# Patient Record
Sex: MALE | Race: OTHER | HISPANIC OR LATINO | URBAN - METROPOLITAN AREA
[De-identification: names, ages, dates, MRNs, and addresses within clinical notes are randomized per-mention and may not be internally consistent; named-entity substitution may affect disease eponyms.]

---

## 2023-03-20 ENCOUNTER — HOSPITAL ENCOUNTER (EMERGENCY)
Facility: HOSPITAL | Age: 38
Discharge: HOME | End: 2023-03-20
Attending: EMERGENCY MEDICINE

## 2023-03-20 VITALS
SYSTOLIC BLOOD PRESSURE: 118 MMHG | TEMPERATURE: 98.3 F | HEART RATE: 67 BPM | DIASTOLIC BLOOD PRESSURE: 72 MMHG | RESPIRATION RATE: 16 BRPM | OXYGEN SATURATION: 99 %

## 2023-03-20 DIAGNOSIS — S01.01XA LACERATION OF SCALP, INITIAL ENCOUNTER: Primary | ICD-10-CM

## 2023-03-20 DIAGNOSIS — S09.90XA CLOSED HEAD INJURY, INITIAL ENCOUNTER: ICD-10-CM

## 2023-03-20 RX ORDER — IBUPROFEN 600 MG/1
600 TABLET ORAL ONCE
Status: COMPLETED | OUTPATIENT
Start: 2023-03-20 | End: 2023-03-20

## 2023-03-20 RX ORDER — ACETAMINOPHEN 325 MG/1
975 TABLET ORAL ONCE
Status: COMPLETED | OUTPATIENT
Start: 2023-03-20 | End: 2023-03-20

## 2023-03-20 RX ORDER — LIDOCAINE 40 MG/G
CREAM TOPICAL ONCE
Status: COMPLETED | OUTPATIENT
Start: 2023-03-20 | End: 2023-03-20

## 2023-03-20 RX ADMIN — IBUPROFEN 600 MG: 600 TABLET, FILM COATED ORAL at 17:18

## 2023-03-20 RX ADMIN — TETANUS TOXOID, REDUCED DIPHTHERIA TOXOID AND ACELLULAR PERTUSSIS VACCINE, ADSORBED 0.5 ML: 5; 2.5; 8; 8; 2.5 SUSPENSION INTRAMUSCULAR at 17:19

## 2023-03-20 RX ADMIN — ACETAMINOPHEN 975 MG: 325 TABLET ORAL at 17:18

## 2023-03-20 RX ADMIN — LIDOCAINE: 40 CREAM TOPICAL at 17:18

## 2023-03-20 NOTE — ED PROVIDER NOTES
History  Chief Complaint   Patient presents with   • Head Laceration     Was cleaning the bathroom, slipped, and hit left side of head on the bathroom door  Patient remembers falling and hitting head  Denies taking blood thinners  Patient is a 44-year-old male no past medical history presenting with laceration to head, head injury  Patient states 1 to 2 hours ago he was cleaning a bathroom when he slipped and hit the left side of his head on the bathroom door on his way down  Did not hit again on the floor denies any LOC  Notes headache since that time and denies any nausea or vomiting, vision changes, numbness or tingling, weakness, dizziness  Does not use any blood thinners  Unknown last tetanus shot and has not taken any medication since then  None       No past medical history on file  Past Surgical History:   Procedure Laterality Date   • TONSILLECTOMY         No family history on file  I have reviewed and agree with the history as documented  E-Cigarette/Vaping     E-Cigarette/Vaping Substances     Social History     Tobacco Use   • Smoking status: Never   • Smokeless tobacco: Never   Substance Use Topics   • Alcohol use: Not Currently   • Drug use: Never       Review of Systems   All other systems reviewed and are negative  Physical Exam  Physical Exam  Vitals reviewed  Constitutional:       General: He is not in acute distress  Appearance: Normal appearance  He is not ill-appearing  HENT:      Head:      Comments: 1 cm laceration to the left scalp just proximal to the hairline, not extending into the galea     Mouth/Throat:      Mouth: Mucous membranes are moist    Eyes:      Extraocular Movements: Extraocular movements intact  Conjunctiva/sclera: Conjunctivae normal       Pupils: Pupils are equal, round, and reactive to light  Cardiovascular:      Rate and Rhythm: Normal rate     Pulmonary:      Effort: Pulmonary effort is normal    Musculoskeletal: General: Normal range of motion  Cervical back: Normal range of motion and neck supple  No tenderness  Skin:     General: Skin is warm and dry  Neurological:      General: No focal deficit present  Mental Status: He is alert  Sensory: No sensory deficit  Motor: No weakness  Coordination: Coordination normal       Gait: Gait normal    Psychiatric:         Mood and Affect: Mood normal          Vital Signs  ED Triage Vitals   Temperature Pulse Respirations Blood Pressure SpO2   03/20/23 1655 03/20/23 1655 03/20/23 1655 03/20/23 1655 03/20/23 1655   98 3 °F (36 8 °C) 67 16 118/72 99 %      Temp Source Heart Rate Source Patient Position - Orthostatic VS BP Location FiO2 (%)   03/20/23 1655 03/20/23 1655 03/20/23 1655 03/20/23 1655 --   Temporal Monitor Sitting Left arm       Pain Score       03/20/23 1718       5           Vitals:    03/20/23 1655   BP: 118/72   Pulse: 67   Patient Position - Orthostatic VS: Sitting         Visual Acuity      ED Medications  Medications   tetanus-diphtheria-acellular pertussis (BOOSTRIX) IM injection 0 5 mL (0 5 mL Intramuscular Given 3/20/23 1719)   lidocaine (LMX) 4 % cream ( Topical Given 3/20/23 1718)   acetaminophen (TYLENOL) tablet 975 mg (975 mg Oral Given 3/20/23 1718)   ibuprofen (MOTRIN) tablet 600 mg (600 mg Oral Given 3/20/23 1718)       Diagnostic Studies  Results Reviewed     None                 No orders to display              Procedures  Laceration repair    Date/Time: 3/20/2023 5:46 PM  Performed by: Silas Kahn DO  Authorized by: Silas Kahn DO   Consent: Verbal consent obtained    Consent given by: patient  Patient understanding: patient states understanding of the procedure being performed  Patient consent: the patient's understanding of the procedure matches consent given  Procedure consent: procedure consent matches procedure scheduled  Relevant documents: relevant documents present and verified  Test results: test results available and properly labeled  Site marked: the operative site was marked  Radiology Images displayed and confirmed  If images not available, report reviewed: imaging studies available  Patient identity confirmed: verbally with patient  Body area: head/neck  Location details: scalp  Laceration length: 1 5 cm    Anesthesia:  Local Anesthetic: LET (lido, epi, tetracaine)    Wound Dehiscence:  Superficial Wound Dehiscence: simple closure      Procedure Details:  Debridement: none  Degree of undermining: none  Skin closure: staples  Number of sutures: 3  Approximation: close  Approximation difficulty: simple               ED Course  ED Course as of 03/20/23 1747   Mon Mar 20, 2023   1742 3 stitches                                             Medical Decision Making  Patient is a 81-year-old male no past medical history presenting with scalp laceration  Patient is well-appearing at bedside with stable vitals and in no acute distress  He is no gross amount is on neurologic exam is ambulatory at bedside with steady gait with roughly 1 cm laceration to the scalp not extending into the galea  Will update tetanus, repair laceration with staples and have discussed with patient that he is [place] head negative should he continue to be without any neurologic deficits or nausea or vomiting at 2 hours post injury, will continue to monitor without CT at this time patient states he is comfortable with this plan  Have discussed return precautions if he is discharged without CT and he states he understands  Risk  OTC drugs  Prescription drug management            Disposition  Final diagnoses:   Laceration of scalp, initial encounter   Closed head injury, initial encounter     Time reflects when diagnosis was documented in both MDM as applicable and the Disposition within this note     Time User Action Codes Description Comment    3/20/2023  5:43 PM Renae Oconnor Add [S01 01XA] Laceration of scalp, initial encounter     3/20/2023  5:43 PM Amparo Carrera Add [S09 90XA] Closed head injury, initial encounter       ED Disposition     ED Disposition   Discharge    Condition   Stable    Date/Time   Mon Mar 20, 2023  5:43 PM    [address] discharge to home/self care  Follow-up Information     Follow up With Specialties Details Why [address] [facility] Emergency Medicine In 5 days For suture removal, If symptoms worsen 34 [facility] 109 [facility] Emergency Department, [address] [place] [address], [address] [place] [address] MD [facility] Schedule an appointment as soon as possible for a visit   21   [address] [facility] 16  [phone]             Patient's Medications    No medications on file       No discharge procedures on file      PDMP Review     None          ED Provider  Electronically Signed by           Sariah Gardner DO  03/20/23 7261

## 2023-04-20 DIAGNOSIS — R50.9 FEVER, UNSPECIFIED FEVER CAUSE: Primary | ICD-10-CM
